# Patient Record
Sex: FEMALE | Race: WHITE | NOT HISPANIC OR LATINO | URBAN - METROPOLITAN AREA
[De-identification: names, ages, dates, MRNs, and addresses within clinical notes are randomized per-mention and may not be internally consistent; named-entity substitution may affect disease eponyms.]

---

## 2018-02-23 ENCOUNTER — OFFICE VISIT - RIVER FALLS (OUTPATIENT)
Dept: FAMILY MEDICINE | Facility: CLINIC | Age: 58
End: 2018-02-23

## 2018-05-10 ENCOUNTER — OFFICE VISIT - RIVER FALLS (OUTPATIENT)
Dept: FAMILY MEDICINE | Facility: CLINIC | Age: 58
End: 2018-05-10

## 2018-05-10 ASSESSMENT — MIFFLIN-ST. JEOR: SCORE: 1223.5

## 2018-08-01 ENCOUNTER — OFFICE VISIT - RIVER FALLS (OUTPATIENT)
Dept: FAMILY MEDICINE | Facility: CLINIC | Age: 58
End: 2018-08-01

## 2018-08-01 ASSESSMENT — MIFFLIN-ST. JEOR: SCORE: 1204.11

## 2019-06-12 ENCOUNTER — OFFICE VISIT - RIVER FALLS (OUTPATIENT)
Dept: FAMILY MEDICINE | Facility: CLINIC | Age: 59
End: 2019-06-12

## 2019-06-12 ASSESSMENT — MIFFLIN-ST. JEOR: SCORE: 1204.11

## 2019-06-13 LAB
CHOLEST SERPL-MCNC: 219 MG/DL
CHOLEST/HDLC SERPL: 2 {RATIO}
GLUCOSE BLD-MCNC: 90 MG/DL (ref 65–99)
HDLC SERPL-MCNC: 108 MG/DL
LDLC SERPL CALC-MCNC: 98 MG/DL
NONHDLC SERPL-MCNC: 111 MG/DL
TRIGL SERPL-MCNC: 44 MG/DL

## 2019-06-16 ENCOUNTER — COMMUNICATION - RIVER FALLS (OUTPATIENT)
Dept: FAMILY MEDICINE | Facility: CLINIC | Age: 59
End: 2019-06-16

## 2022-02-12 VITALS
WEIGHT: 138.4 LBS | BODY MASS INDEX: 22.24 KG/M2 | SYSTOLIC BLOOD PRESSURE: 112 MMHG | DIASTOLIC BLOOD PRESSURE: 60 MMHG | TEMPERATURE: 99.7 F | HEART RATE: 80 BPM | HEIGHT: 66 IN

## 2022-02-12 VITALS
DIASTOLIC BLOOD PRESSURE: 73 MMHG | BODY MASS INDEX: 22.33 KG/M2 | WEIGHT: 139.4 LBS | TEMPERATURE: 98.6 F | HEART RATE: 69 BPM | SYSTOLIC BLOOD PRESSURE: 115 MMHG

## 2022-02-12 VITALS
WEIGHT: 135 LBS | RESPIRATION RATE: 18 BRPM | OXYGEN SATURATION: 97 % | SYSTOLIC BLOOD PRESSURE: 112 MMHG | HEART RATE: 62 BPM | BODY MASS INDEX: 21.69 KG/M2 | TEMPERATURE: 97.5 F | HEIGHT: 66 IN | DIASTOLIC BLOOD PRESSURE: 72 MMHG

## 2022-02-12 VITALS
TEMPERATURE: 98.1 F | SYSTOLIC BLOOD PRESSURE: 88 MMHG | HEIGHT: 66 IN | DIASTOLIC BLOOD PRESSURE: 66 MMHG | WEIGHT: 135 LBS | BODY MASS INDEX: 21.69 KG/M2 | OXYGEN SATURATION: 96 % | HEART RATE: 64 BPM

## 2022-02-15 NOTE — LETTER
(Inserted Image. Unable to display)   June 16, 2019        SABINO AKHIL      [address]  [place], [place] [id number]        Dear SABINO,    Thank you for selecting [facility] for your health care needs.  Below you will find the results of your recent test(s) done at our clinic.     The tests were normal.      Result Name Current Result Reference Range   Glucose Level (mg/dL)  90 6/12/2019 65 - 99   Cholesterol (mg/dL) ((H)) 219 6/12/2019  - <200   Non-HDL Cholesterol  111 6/12/2019  - <130   HDL (mg/dL)  108 6/12/2019 >50 -    Cholesterol/HDL Ratio  2.0 6/12/2019  - <5.0   LDL  98 6/12/2019    Triglyceride (mg/dL)  44 6/12/2019  - <150       Please contact me or my assistant at [phone] if you have any questions about your results.    Sincerely,        Robert Villalba MD        What do your labs mean?  Below is a glossary of commonly ordered labs:  LDL   Bad Cholesterol   HDL   Good Cholesterol  AST/ALT   Liver Function   Cr/Creatinine   Kidney Function  Microalbumin   Kidney Function  BUN   Kidney Function  PSA   Prostate    TSH   Thyroid Hormone  HgbA1c   Diabetes Test   Hgb (Hemoglobin)   Red Blood Cells
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
## 2022-02-15 NOTE — NURSING NOTE
Comprehensive Intake Entered On:  6/12/2019 10:29 AM CDT    Performed On:  6/12/2019 10:23 AM CDT by Misa Ji CMA   Chief Complaint :   Annual physical. Patient is fasting today   Menstrual Status :   Hysterectomy   Weight Measured :   135 lb(Converted to: 135 lb 0 oz, 61.23 kg)    Height Measured :   66 in(Converted to: 5 ft 6 in, 167.64 cm)    Body Mass Index :   21.79 kg/m2   Body Surface Area :   1.69 m2   Systolic Blood Pressure :   88 mmHg (LOW)    Diastolic Blood Pressure :   66 mmHg   Mean Arterial Pressure :   73 mmHg   Peripheral Pulse Rate :   64 bpm   BP Site :   Right arm   BP Method :   Manual   Temperature Tympanic :   98.1 DegF(Converted to: 36.7 DegC)    Oxygen Saturation :   96 %   Misa Ji CMA - 6/12/2019 10:23 AM CDT   Health Status   Allergies Verified? :   Yes   Medication History Verified? :   Yes   Medical History Verified? :   Yes   Pre-Visit Planning Status :   Completed   Tobacco Use? :   Never smoker   Misa Ji CMA - 6/12/2019 10:23 AM CDT   Consents   Consent for Immunization Exchange :   Consent Granted   Consent for Immunizations to Providers :   Consent Granted   Misa Ji CMA - 6/12/2019 10:23 AM CDT   Meds / Allergies   (As Of: 6/12/2019 10:29:40 AM CDT)   Allergies (Active)   morphine  Estimated Onset Date:   Unspecified ; Reactions:   Vomiting ; Created By:   Annika Saeed CMA; Reaction Status:   Active ; Category:   Drug ; Substance:   morphine ; Type:   Allergy ; Updated By:   Annika Saeed CMA; Reviewed Date:   6/12/2019 10:26 AM CDT        Medication List   (As Of: 6/12/2019 10:29:40 AM CDT)   Home Meds    ascorbic acid/chondroitin/glucosa/danny  :   ascorbic acid/chondroitin/glucosa/danny ; Status:   Documented ; Ordered As Mnemonic:   Glucosamine Chondroitin ; Simple Display Line:   1 tab, Oral, daily, 0 Refill(s) ; Catalog Code:   ascorbic acid/chondroitin/glucosa/danny ; Order Dt/Tm:   6/12/2019 10:27:15 AM           Miscellaneous Prescription  :   Miscellaneous Prescription ; Status:   Documented ; Ordered As Mnemonic:   Calcium ; Catalog Code:   Miscellaneous Prescription ; Order Dt/Tm:   10/27/2014 11:13:53 AM          multivitamin  :   multivitamin ; Status:   Documented ; Ordered As Mnemonic:   Daily Multiple Vitamins ; Simple Display Line:   po, daily ; Catalog Code:   multivitamin ; Order Dt/Tm:   3/10/2011 8:01:58 PM          omega-3 polyunsaturated fatty acids  :   omega-3 polyunsaturated fatty acids ; Status:   Documented ; Ordered As Mnemonic:   Fish Oil ; Catalog Code:   omega-3 polyunsaturated fatty acids ; Order Dt/Tm:   3/10/2011 8:02:05 PM

## 2022-02-15 NOTE — NURSING NOTE
CAGE Assessment Entered On:  6/12/2019 12:44 PM CDT    Performed On:  6/12/2019 12:44 PM CDT by Veronica Tsai MA               Assessment   Have you ever felt you should cut down on your drinking :   No   Have people annoyed you by criticizing your drinking :   No   Have you ever felt bad or guilty about your drinking :   No   Have you ever taken a drink first thing in the morning to steady your nerves or get rid of a hangover (Eye-opener) :   No   CAGE Score :   0    Veronica Tsai MA - 6/12/2019 12:44 PM CDT

## 2022-02-15 NOTE — TELEPHONE ENCOUNTER
Order, demographics and note brought to [facility] per patients request - needing to have done before end of June 2019 due to insurance-will be having with Dr. Young 6/20/19